# Patient Record
Sex: MALE | NOT HISPANIC OR LATINO | Employment: FULL TIME | URBAN - METROPOLITAN AREA
[De-identification: names, ages, dates, MRNs, and addresses within clinical notes are randomized per-mention and may not be internally consistent; named-entity substitution may affect disease eponyms.]

---

## 2017-04-05 ENCOUNTER — PATIENT MESSAGE (OUTPATIENT)
Dept: MEDICAL GROUP | Facility: MEDICAL CENTER | Age: 32
End: 2017-04-05

## 2017-12-04 ENCOUNTER — APPOINTMENT (OUTPATIENT)
Dept: ADMISSIONS | Facility: MEDICAL CENTER | Age: 32
End: 2017-12-04
Attending: ORTHOPAEDIC SURGERY
Payer: COMMERCIAL

## 2017-12-04 RX ORDER — IBUPROFEN 200 MG
200 TABLET ORAL EVERY 6 HOURS PRN
COMMUNITY
End: 2017-12-28

## 2017-12-07 ENCOUNTER — HOSPITAL ENCOUNTER (OUTPATIENT)
Facility: MEDICAL CENTER | Age: 32
End: 2017-12-07
Attending: ORTHOPAEDIC SURGERY | Admitting: ORTHOPAEDIC SURGERY
Payer: COMMERCIAL

## 2017-12-07 VITALS
HEIGHT: 74 IN | BODY MASS INDEX: 32.08 KG/M2 | OXYGEN SATURATION: 95 % | SYSTOLIC BLOOD PRESSURE: 112 MMHG | RESPIRATION RATE: 12 BRPM | DIASTOLIC BLOOD PRESSURE: 58 MMHG | WEIGHT: 250 LBS | TEMPERATURE: 97.2 F

## 2017-12-07 PROCEDURE — 700101 HCHG RX REV CODE 250

## 2017-12-07 PROCEDURE — A9270 NON-COVERED ITEM OR SERVICE: HCPCS

## 2017-12-07 PROCEDURE — 502581 HCHG PACK, SHOULDER ARTHROSCOPY: Performed by: ORTHOPAEDIC SURGERY

## 2017-12-07 PROCEDURE — 700105 HCHG RX REV CODE 258: Performed by: ORTHOPAEDIC SURGERY

## 2017-12-07 PROCEDURE — 501838 HCHG SUTURE GENERAL: Performed by: ORTHOPAEDIC SURGERY

## 2017-12-07 PROCEDURE — 160041 HCHG SURGERY MINUTES - EA ADDL 1 MIN LEVEL 4: Performed by: ORTHOPAEDIC SURGERY

## 2017-12-07 PROCEDURE — 160035 HCHG PACU - 1ST 60 MINS PHASE I: Performed by: ORTHOPAEDIC SURGERY

## 2017-12-07 PROCEDURE — 160025 RECOVERY II MINUTES (STATS): Performed by: ORTHOPAEDIC SURGERY

## 2017-12-07 PROCEDURE — 160036 HCHG PACU - EA ADDL 30 MINS PHASE I: Performed by: ORTHOPAEDIC SURGERY

## 2017-12-07 PROCEDURE — 700111 HCHG RX REV CODE 636 W/ 250 OVERRIDE (IP)

## 2017-12-07 PROCEDURE — 160048 HCHG OR STATISTICAL LEVEL 1-5: Performed by: ORTHOPAEDIC SURGERY

## 2017-12-07 PROCEDURE — 500028 HCHG ARTHROWAND TURBOVAC 3.5/90 SUCT.: Performed by: ORTHOPAEDIC SURGERY

## 2017-12-07 PROCEDURE — 160029 HCHG SURGERY MINUTES - 1ST 30 MINS LEVEL 4: Performed by: ORTHOPAEDIC SURGERY

## 2017-12-07 PROCEDURE — 160009 HCHG ANES TIME/MIN: Performed by: ORTHOPAEDIC SURGERY

## 2017-12-07 PROCEDURE — 160046 HCHG PACU - 1ST 60 MINS PHASE II: Performed by: ORTHOPAEDIC SURGERY

## 2017-12-07 PROCEDURE — 160002 HCHG RECOVERY MINUTES (STAT): Performed by: ORTHOPAEDIC SURGERY

## 2017-12-07 PROCEDURE — 700102 HCHG RX REV CODE 250 W/ 637 OVERRIDE(OP)

## 2017-12-07 PROCEDURE — 500144 HCHG CANNULA KIT, UNIV GREY-SHOULDER: Performed by: ORTHOPAEDIC SURGERY

## 2017-12-07 RX ORDER — BUPIVACAINE HYDROCHLORIDE AND EPINEPHRINE 5; 5 MG/ML; UG/ML
INJECTION, SOLUTION EPIDURAL; INTRACAUDAL; PERINEURAL
Status: DISCONTINUED | OUTPATIENT
Start: 2017-12-07 | End: 2017-12-07 | Stop reason: HOSPADM

## 2017-12-07 RX ORDER — SODIUM CHLORIDE, SODIUM LACTATE, POTASSIUM CHLORIDE, CALCIUM CHLORIDE 600; 310; 30; 20 MG/100ML; MG/100ML; MG/100ML; MG/100ML
INJECTION, SOLUTION INTRAVENOUS
Status: DISCONTINUED | OUTPATIENT
Start: 2017-12-07 | End: 2017-12-07 | Stop reason: HOSPADM

## 2017-12-07 RX ORDER — HYDROCODONE BITARTRATE AND ACETAMINOPHEN 5; 325 MG/1; MG/1
1-2 TABLET ORAL EVERY 4 HOURS PRN
Qty: 45 TAB | Refills: 0 | Status: SHIPPED | OUTPATIENT
Start: 2017-12-07 | End: 2017-12-28

## 2017-12-07 RX ORDER — HALOPERIDOL 5 MG/ML
INJECTION INTRAMUSCULAR
Status: COMPLETED
Start: 2017-12-07 | End: 2017-12-07

## 2017-12-07 RX ORDER — GINSENG 100 MG
CAPSULE ORAL
Status: DISCONTINUED | OUTPATIENT
Start: 2017-12-07 | End: 2017-12-07 | Stop reason: HOSPADM

## 2017-12-07 RX ORDER — OXYCODONE HCL 5 MG/5 ML
SOLUTION, ORAL ORAL
Status: COMPLETED
Start: 2017-12-07 | End: 2017-12-07

## 2017-12-07 RX ADMIN — OXYCODONE HYDROCHLORIDE 10 MG: 5 SOLUTION ORAL at 10:49

## 2017-12-07 RX ADMIN — SODIUM CHLORIDE, POTASSIUM CHLORIDE, SODIUM LACTATE AND CALCIUM CHLORIDE: 600; 310; 30; 20 INJECTION, SOLUTION INTRAVENOUS at 07:47

## 2017-12-07 RX ADMIN — FENTANYL CITRATE 50 MCG: 50 INJECTION, SOLUTION INTRAMUSCULAR; INTRAVENOUS at 10:48

## 2017-12-07 RX ADMIN — HALOPERIDOL LACTATE 1 MG: 5 INJECTION, SOLUTION INTRAMUSCULAR at 10:48

## 2017-12-07 ASSESSMENT — PAIN SCALES - GENERAL
PAINLEVEL_OUTOF10: 0
PAINLEVEL_OUTOF10: ASSUMED PAIN PRESENT
PAINLEVEL_OUTOF10: 0
PAINLEVEL_OUTOF10: 0

## 2017-12-07 NOTE — OR NURSING
1130  Pt to stage two via jenna. Pt denies pain and nausea at this time. Pt getting dressed with help of CNA.   1135 Pt up to chair with help of CNA. VSS.   1200Explained discharge instructions to pt and pts wife. Both express understanding   1205 Pt meets criteria to be discharged after uneventful stay in stage two

## 2017-12-07 NOTE — OR NURSING
" 0950 To PACU from OR on [facility]. All bedside rails up for safe transfer. Sleeping with opa. Even non labored breathing. Lung sounds bilaterally. Skin pink warm dry. Rue dressing cdi, sling, ice pack, 2+ radial pulse, brisk cap refill, pink warm.    0959 no changes  1013 no changes  1017 opa removed. Spontaneous even non labored breathing. arousable and calling. Denies pain and nausea. Resting with eyes closed, face relaxed.   1049 awake and alert. C/o pain and nausea. Medicated.   1104 resting with eyes closed, face relaxed, even non labored breathing.   1128 awake, denies pain and nausea. States \"ready for discharge\"   1130 pt meets criteria for stage 2. Report given to Daisy kate    "

## 2017-12-07 NOTE — OP REPORT
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    POSTOPERATIVE DIAGNOSIS: right shoulder type 2 acromion and acromioclavicular arthritis.     PROCEDURE PERFORMED: right shoulder arthroscopic subacromioal decompression and distal clavicle excision    SURGEON: Stephen Gipson MD    ANESTHESIA: General     ANTIBIOTICS: Ancef    COMPLICATIONS: None.     INDICATIONS: The patient presents with right shoulder pain recalcitrant to conservative treatment. The patient has evidence of a right shoulder rotator cuff impingement with type 2 acromion and AC arthritis on imaging. Options were discussed, including both non operative and operative treatments. Risks of surgery were discussed at length. All questions were answered. No guarantees were given.     PROCEDURE IN DETAIL: The patient was properly identified in the preoperative holding area, and the right shoulder was marked as the correct surgical site. The patient was then taken back to the operative room, and placed supine on the operating room table and underwent general anesthesia. The patient was placed in a beach chair position. The right upper extremity was sterilely prepped and draped in standard fashion.     A standard posterior portal was created. The scope was placed up the glenohumeral joint. An anterior portal was created through the rotator cuff interval just below the biceps. The was no SLAP tear.   There was no evidence of biceps tendinosis. There was no rotator cuff tear. . The rest of the joint was inspected. There was no chondromalacia both in the humeral head and the glenoid. No loose bodies were seen.    The scope was then placed in the subacromial space. A lateral portal was created. A bursectomy was performed, exposing the anterior and lateral acromion. A 5/5 resector was used to perform a subacromial decompression removing several millimeters of bone off the anterior and lateral  acromion and the through the anterior portal a distal clavicle excision was preformed with a 5/5 resector.  The rotator cuff looked good from the bursal side as well.  Excess fluid was drained. Incision were closed with 2-0 vicryl and 3-0 nylon. A total of 20ml 1% marcaine with epinephrine was injected. TATIANA Zhu, was present throughout the operation as an essential part of the success of there operation assisting with patient position, instrumentation, retraction and closure.     The patient was extubated and transferred to the recovery room in stable condition.

## 2017-12-07 NOTE — DISCHARGE INSTRUCTIONS
ACTIVITY: Rest and take it easy for the first 24 hours.  A responsible adult is recommended to remain with you during that time.  It is normal to feel sleepy.  We encourage you to not do anything that requires balance, judgment or coordination.    MILD FLU-LIKE SYMPTOMS ARE NORMAL. YOU MAY EXPERIENCE GENERALIZED MUSCLE ACHES, THROAT IRRITATION, HEADACHE AND/OR SOME NAUSEA.    FOR 24 HOURS DO NOT:  Drive, operate machinery or run household appliances.  Drink beer or alcoholic beverages.   Make important decisions or sign legal documents.    SPECIAL INSTRUCTIONS:    Sling as needed    Keep dressings on and dry for five days then may apply band-aids.    Physical therapy  Call for questions or concerns.    DIET: To avoid nausea, slowly advance diet as tolerated, avoiding spicy or greasy foods for the first day.  Add more substantial food to your diet according to your physician's instructions.  Babies can be fed formula or breast milk as soon as they are hungry.  INCREASE FLUIDS AND FIBER TO AVOID CONSTIPATION.      FOLLOW-UP APPOINTMENT:  A follow-up appointment should be arranged with your doctor: call to schedule.    You should CALL YOUR PHYSICIAN if you develop:  Fever greater than 101 degrees F.  Pain not relieved by medication, or persistent nausea or vomiting.  Excessive bleeding (blood soaking through dressing) or unexpected drainage from the wound.  Extreme redness or swelling around the incision site, drainage of pus or foul smelling drainage.  Inability to urinate or empty your bladder within 8 hours.  Problems with breathing or chest pain.    You should call 911 if you develop problems with breathing or chest pain.  If you are unable to contact your doctor or surgical center, you should go to the nearest emergency room or urgent care center.  Physician's telephone #: [phone]    If any questions arise, call your doctor.  If your doctor is not available, please feel free to call the Surgical Center at  [phone].  The Center is open Monday through Friday from 7AM to 7PM.  You can also call the HEALTH HOTLINE open 24 hours/day, 7 days/week and speak to a nurse at [phone], or toll free at [phone].    A registered nurse may call you a few days after your surgery to see how you are doing after your procedure.    MEDICATIONS: Resume taking daily medication.  Take prescribed pain medication with food.  If no medication is prescribed, you may take non-aspirin pain medication if needed.  PAIN MEDICATION CAN BE VERY CONSTIPATING.  Take a stool softener or laxative such as senokot, pericolace, or milk of magnesia if needed.    Prescription given for norco.  Last pain medication given at 1049 10 mg oxycodone.    If your physician has prescribed pain medication that includes Acetaminophen (Tylenol), do not take additional Acetaminophen (Tylenol) while taking the prescribed medication.    Depression / Suicide Risk    As you are discharged from this [facility] facility, it is important to learn how to keep safe from harming yourself.    Recognize the warning signs:  · Abrupt changes in personality, positive or negative- including increase in energy   · Giving away possessions  · Change in eating patterns- significant weight changes-  positive or negative  · Change in sleeping patterns- unable to sleep or sleeping all the time   · Unwillingness or inability to communicate  · Depression  · Unusual sadness, discouragement and loneliness  · Talk of wanting to die  · Neglect of personal appearance   · Rebelliousness- reckless behavior  · Withdrawal from people/activities they love  · Confusion- inability to concentrate     If you or a loved one observes any of these behaviors or has concerns about self-harm, here's what you can do:  · Talk about it- your feelings and reasons for harming yourself  · Remove any means that you might use to hurt yourself (examples: pills, rope, extension cords, firearm)  · Get  professional help from the community (Mental Health, Substance Abuse, psychological counseling)  · Do not be alone:Call your Safe Contact- someone whom you trust who will be there for you.  · Call your local CRISIS HOTLINE 690-1562 or 912-333-4509  · Call your local Children's Mobile Crisis Response Team [place] (868) 717-8685 or www.[employer]  · Call the toll free National Suicide Prevention Hotlines   · National Suicide Prevention Lifeline 496-754-IVWV (6603)  · National Hope Line Network 800-SUICIDE (329-9847)

## 2017-12-28 ENCOUNTER — OFFICE VISIT (OUTPATIENT)
Dept: MEDICAL GROUP | Facility: PHYSICIAN GROUP | Age: 32
End: 2017-12-28
Payer: COMMERCIAL

## 2017-12-28 VITALS
HEIGHT: 74 IN | OXYGEN SATURATION: 97 % | RESPIRATION RATE: 16 BRPM | WEIGHT: 250 LBS | BODY MASS INDEX: 32.08 KG/M2 | TEMPERATURE: 98.2 F | SYSTOLIC BLOOD PRESSURE: 130 MMHG | HEART RATE: 84 BPM | DIASTOLIC BLOOD PRESSURE: 78 MMHG

## 2017-12-28 DIAGNOSIS — E66.9 OBESITY (BMI 30-39.9): ICD-10-CM

## 2017-12-28 DIAGNOSIS — G89.29 CHRONIC RIGHT SHOULDER PAIN: ICD-10-CM

## 2017-12-28 DIAGNOSIS — M25.511 CHRONIC RIGHT SHOULDER PAIN: ICD-10-CM

## 2017-12-28 DIAGNOSIS — S81.802A WOUND OF LEFT LOWER EXTREMITY, INITIAL ENCOUNTER: ICD-10-CM

## 2017-12-28 PROCEDURE — 99214 OFFICE O/P EST MOD 30 MIN: CPT | Performed by: FAMILY MEDICINE

## 2017-12-28 RX ORDER — TRIAMCINOLONE ACETONIDE 1 MG/G
CREAM TOPICAL
Qty: 1 TUBE | Refills: 2 | Status: SHIPPED | OUTPATIENT
Start: 2017-12-28 | End: 2018-01-12

## 2017-12-28 RX ORDER — CEPHALEXIN 500 MG/1
500 CAPSULE ORAL 4 TIMES DAILY
Qty: 20 CAP | Refills: 0 | Status: SHIPPED | OUTPATIENT
Start: 2017-12-28 | End: 2018-01-02

## 2017-12-28 RX ORDER — IBUPROFEN 600 MG/1
600 TABLET ORAL EVERY 6 HOURS PRN
Qty: 90 TAB | Refills: 2 | Status: SHIPPED | OUTPATIENT
Start: 2017-12-28 | End: 2018-05-12 | Stop reason: SDUPTHER

## 2017-12-28 RX ORDER — TRAMADOL HYDROCHLORIDE 50 MG/1
50 TABLET ORAL EVERY 8 HOURS PRN
Qty: 21 TAB | Refills: 0 | Status: SHIPPED | OUTPATIENT
Start: 2017-12-28 | End: 2018-01-04

## 2017-12-28 ASSESSMENT — PATIENT HEALTH QUESTIONNAIRE - PHQ9: CLINICAL INTERPRETATION OF PHQ2 SCORE: 0

## 2017-12-28 NOTE — ASSESSMENT & PLAN NOTE
Left leg wound sustained about 12/7/17, about the same time he had right shoulder arthroscopic surgery.   He cleaned the wound, applied a bandaid and it looked like it was healing as normal, but about 3 days ago he noticed it looked red and inflammed and developed some bumpy lesions.   Will try short course of antibiotic and topical steroid cream if now improvement will need to get biopsy.

## 2017-12-28 NOTE — ASSESSMENT & PLAN NOTE
Had recent arthroscopic surgery done by Dr. Kathleen and he has a follow up appointment on Jan 9th. He is going to schedule PT.   Using some ibuprofen needs a refill

## 2017-12-28 NOTE — LETTER
2017        Perfecto Velasco ERICA 1985 was evaluated by me in clinic today. Please excuse him from work for 17.     Contact my office with any questions.     Thank you,         Arcelia Padilla M.D.

## 2017-12-28 NOTE — PROGRESS NOTES
"Subjective:   Perfecto Velasco is a 32 y.o. male here today for evaluation and management of:     Leg wound, left  Left leg wound sustained about 12/7/17, about the same time he had right shoulder arthroscopic surgery.   He cleaned the wound, applied a bandaid and it looked like it was healing as normal, but about 3 days ago he noticed it looked red and inflammed and developed some bumpy lesions.   Will try short course of antibiotic and topical steroid cream if now improvement will need to get biopsy.       Chronic right shoulder pain  Had recent arthroscopic surgery done by Dr. Kathleen and he has a follow up appointment on Jan 9th. He is going to schedule PT.   Using some ibuprofen needs a refill         Current medicines (including changes today)  Current Outpatient Prescriptions   Medication Sig Dispense Refill   • triamcinolone acetonide (KENALOG) 0.1 % Cream Use a pea sized amount and apply in a thin film over affected skin twice a day for 4 weeks. Stop for a week if continuing. 1 Tube 2   • ibuprofen (MOTRIN) 600 MG Tab Take 1 Tab by mouth every 6 hours as needed. 90 Tab 2   • omeprazole (PRILOSEC) 20 MG delayed-release capsule Take 1 Cap by mouth every day. 30 Cap 6     No current facility-administered medications for this visit.      He  has a past medical history of Anesthesia; GERD (gastroesophageal reflux disease); and Shoulder pain. He also has no past medical history of Hyperlipidemia.    ROS  No chest pain, no shortness of breath, no abdominal pain       Objective:     Blood pressure 130/78, pulse 84, temperature 36.8 °C (98.2 °F), resp. rate 16, height 1.88 m (6' 2\"), weight 113.4 kg (250 lb), SpO2 97 %. Body mass index is 32.1 kg/m².   Physical Exam:  Constitutional: Alert, no distress.  Skin: Warm, dry, good turgor, no rashes in visible areas. Left leg mid shin irregular raised nodular erythematous, dry shiny scabby lesion over site of cut.   No bleeding, no pus,   Eye: Equal, round and reactive, " conjunctiva clear, lids normal.  ENMT: Lips without lesions, good dentition, oropharynx clear.  Neck: Trachea midline, no masses, no thyromegaly. No cervical or supraclavicular lymphadenopathy  Respiratory: Unlabored respiratory effort, lungs clear to auscultation, no wheezes, no ronchi.  Cardiovascular: Normal S1, S2, no murmur, no edema.  Abdomen: Soft, non-tender, no masses, no hepatosplenomegaly.  Psych: Alert and oriented x3, normal affect and mood.        Assessment and Plan:   The following treatment plan was discussed    1. Wound of left lower extremity, initial encounter  Biopsy if no improvement after topical antibiotic and topical steroid.       2. Obesity (BMI 30-39.9)  - Patient identified as having weight management issue.  Appropriate orders and counseling given.    3. Chronic right shoulder pain  - ibuprofen (MOTRIN) 600 MG Tab; Take 1 Tab by mouth every 6 hours as needed.  Dispense: 90 Tab; Refill: 2  - tramadol (ULTRAM) 50 MG Tab; Take 1 Tab by mouth every 8 hours as needed for up to 7 days.  Dispense: 21 Tab; Refill: 0  - CONSENT FOR OPIATE PRESCRIPTION      Followup: Return in about 2 months (around 2/28/2018) for skin biopsy.

## 2018-01-12 ENCOUNTER — OFFICE VISIT (OUTPATIENT)
Dept: MEDICAL GROUP | Facility: PHYSICIAN GROUP | Age: 33
End: 2018-01-12
Payer: COMMERCIAL

## 2018-01-12 VITALS
HEART RATE: 98 BPM | TEMPERATURE: 99.2 F | BODY MASS INDEX: 32.08 KG/M2 | DIASTOLIC BLOOD PRESSURE: 80 MMHG | HEIGHT: 74 IN | RESPIRATION RATE: 20 BRPM | WEIGHT: 250 LBS | OXYGEN SATURATION: 98 % | SYSTOLIC BLOOD PRESSURE: 132 MMHG

## 2018-01-12 DIAGNOSIS — J02.9 SORE THROAT: ICD-10-CM

## 2018-01-12 DIAGNOSIS — J06.9 VIRAL UPPER RESPIRATORY TRACT INFECTION: ICD-10-CM

## 2018-01-12 DIAGNOSIS — R05.9 COUGH WITH FEVER: ICD-10-CM

## 2018-01-12 DIAGNOSIS — R50.9 COUGH WITH FEVER: ICD-10-CM

## 2018-01-12 LAB
FLUAV+FLUBV AG SPEC QL IA: NEGATIVE
INT CON NEG: NEGATIVE
INT CON NEG: NEGATIVE
INT CON POS: POSITIVE
INT CON POS: POSITIVE
S PYO AG THROAT QL: NEGATIVE

## 2018-01-12 PROCEDURE — 99213 OFFICE O/P EST LOW 20 MIN: CPT | Performed by: NURSE PRACTITIONER

## 2018-01-12 PROCEDURE — 87880 STREP A ASSAY W/OPTIC: CPT | Performed by: NURSE PRACTITIONER

## 2018-01-12 PROCEDURE — 87804 INFLUENZA ASSAY W/OPTIC: CPT | Performed by: NURSE PRACTITIONER

## 2018-01-12 RX ORDER — CODEINE PHOSPHATE AND GUAIFENESIN 10; 100 MG/5ML; MG/5ML
5 SOLUTION ORAL EVERY 6 HOURS PRN
Qty: 280 ML | Refills: 0 | Status: SHIPPED | OUTPATIENT
Start: 2018-01-12 | End: 2018-01-26

## 2018-01-12 NOTE — PATIENT INSTRUCTIONS
Elevate head of bed  Humidifier  Honey and lemon juice  Delsyn cough syrup during day  Codeine cough syrup at night

## 2018-01-12 NOTE — PROGRESS NOTES
CC: Sore throat and cough    HISTORY OF THE PRESENT ILLNESS: Patient is a 32 y.o. male. This pleasant patient is here today for sore throat and cough.      URI (upper respiratory infection)  This is new onset per patient, which started 2 days ago with a cough and sore throat. Last night he developed body aches and chills. He reports that the cough is keeping him up at night. He reports a white patch on his left tonsil. He denies nasal congestion, otalgia, rash, diarrhea, and abdominal pain. He does report some mild nausea last night. He is able to eat and drink adequately. He has tried Mucinex and cough drops. He reports that his wife and kids were sick 2 weeks ago. No one at home is sick currently. Patient has a low-grade temp today of 99.2. We will swab for strep and influenza.      Allergies: Amoxicillin    Current Outpatient Prescriptions Ordered in [facility]   Medication Sig Dispense Refill   • guaifenesin-codeine (ROBITUSSIN AC) Solution oral solution Take 5 mL by mouth every 6 hours as needed for Cough for up to 14 days. 280 mL 0   • ibuprofen (MOTRIN) 600 MG Tab Take 1 Tab by mouth every 6 hours as needed. 90 Tab 2     No current Epic-ordered facility-administered medications on file.        Past Medical History:   Diagnosis Date   • Anesthesia     wakes up combative   • GERD (gastroesophageal reflux disease)    • Shoulder pain        Past Surgical History:   Procedure Laterality Date   • SHOULDER DECOMPRESSION ARTHROSCOPIC Right 12/7/2017    Procedure: SHOULDER DECOMPRESSION ARTHROSCOPIC - SUBACROMIAL;  Surgeon: Stephen Gipson M.D.;  Location: SURGERY [facility];  Service: Orthopedics   • CLAVICLE DISTAL EXCISION Right 12/7/2017    Procedure: CLAVICLE DISTAL EXCISION;  Surgeon: Stephen Gipson M.D.;  Location: SURGERY [facility];  Service: Orthopedics   • OTHER  2003    nasal, wrist surgeries post trauma   • OTHER      wisdom teeth extraction   • SEPTAL RECONSTRUCTION         Social History  "  Substance Use Topics   • Smoking status: Never Smoker   • Smokeless tobacco: Never Used   • Alcohol use 0.0 oz/week      Comment: 1-2 drinks per week       Family History   Problem Relation Age of Onset   • Hypertension Father    • Cancer Maternal Aunt      brain   • No Known Problems Sister    • No Known Problems Brother        ROS:    As in HPI         Exam: Blood pressure 132/80, pulse 98, temperature 37.3 °C (99.2 °F), resp. rate 20, height 1.88 m (6' 2\"), weight 113.4 kg (250 lb), SpO2 98 %. Body mass index is 32.1 kg/m².    General: Alert, ill-appearing, well nourished, well developed male in NAD  HEENT: Normocephalic. Eyes conjunctiva clear lids without ptosis, pupils equal and reactive to light, ears normal shape and contour, canals are clear bilaterally, tympanic membranes are pearly gray with good light reflex, nasal mucosa without erythema and drainage. Oropharynx with mild erythema, no tonsillar enlargement, one white spot on left tonsil.    Neck: Supple. No lymphadenopathy.  Pulmonary: Clear to ausculation.  Normal effort. No rales, ronchi, or wheezing.  Cardiovascular: Regular rate and rhythm without murmur.   Abdomen: Soft, nondistended. Mild epigastric tenderness with palpation. Normal bowel sounds. Liver and spleen are not palpable  Skin: Warm and dry.  No obvious rash.    Please note that this dictation was created using voice recognition software. I have made every reasonable attempt to correct obvious errors, but I expect that there are errors of grammar and possibly content that I did not discover before finalizing the note.      Assessment/Plan  1. Sore throat  Negative result  - POCT Rapid Strep A    2. Cough with fever  Negative result.  - POCT Influenza A/B    3. Viral upper respiratory tract infection  This is likely a viral infection, and influenza has been ruled out. I have instructed patient on comfort measures such as humidifier, honey and lemon juice for cough, and elevating head of " bed. I have prescribed a cough syrup for nighttime. Patient will follow-up in clinic in one week if symptoms have not improved or sooner if symptoms get worse.  - guaifenesin-codeine (ROBITUSSIN AC) Solution oral solution; Take 5 mL by mouth every 6 hours as needed for Cough for up to 14 days.  Dispense: 280 mL; Refill: 0

## 2018-01-12 NOTE — ASSESSMENT & PLAN NOTE
This is new onset per patient, which started 2 days ago with a cough and sore throat. Last night he developed body aches and chills. He reports that the cough is keeping him up at night. He reports a white patch on his left tonsil. He denies nasal congestion, otalgia, rash, diarrhea, and abdominal pain. He does report some mild nausea last night. He is able to eat and drink adequately. He has tried Mucinex and cough drops. He reports that his wife and kids were sick 2 weeks ago. No one at home is sick currently. Patient has a low-grade temp today of 99.2. We will swab for strep and influenza.

## 2018-01-12 NOTE — LETTER
[address] [address]                                       January 12, 2018    Patient: Perfecto Velasco   YOB: 1985   Date of Visit: 1/12/2018       To Whom It May Concern:    Perfecto Velasco was seen and treated in our department on 1/12/2018.     Sincerely,     CHARLES Pickett.

## 2018-01-12 NOTE — LETTER
[address] [address]     January 12, 2018    Patient: Perfecto Velasco   YOB: 1985   Date of Visit: 1/12/2018       To Whom It May Concern:    Perfecto Velasco was seen and treated in our department on 1/12/2018.     Sincerely,     CHARLES Pickett.

## 2018-04-11 ENCOUNTER — PATIENT MESSAGE (OUTPATIENT)
Dept: MEDICAL GROUP | Facility: PHYSICIAN GROUP | Age: 33
End: 2018-04-11

## 2018-04-11 DIAGNOSIS — K21.9 GASTROESOPHAGEAL REFLUX DISEASE WITHOUT ESOPHAGITIS: ICD-10-CM

## 2018-04-13 RX ORDER — OMEPRAZOLE 20 MG/1
20 CAPSULE, DELAYED RELEASE ORAL DAILY
Qty: 30 CAP | Refills: 6 | Status: SHIPPED | OUTPATIENT
Start: 2018-04-13 | End: 2021-10-26

## 2018-04-28 ENCOUNTER — OFFICE VISIT (OUTPATIENT)
Dept: URGENT CARE | Facility: PHYSICIAN GROUP | Age: 33
End: 2018-04-28
Payer: COMMERCIAL

## 2018-04-28 VITALS
HEART RATE: 112 BPM | OXYGEN SATURATION: 97 % | TEMPERATURE: 98.6 F | HEIGHT: 74 IN | RESPIRATION RATE: 18 BRPM | SYSTOLIC BLOOD PRESSURE: 112 MMHG | WEIGHT: 252 LBS | DIASTOLIC BLOOD PRESSURE: 70 MMHG | BODY MASS INDEX: 32.34 KG/M2

## 2018-04-28 DIAGNOSIS — J22 LOWER RESPIRATORY INFECTION (E.G., BRONCHITIS, PNEUMONIA, PNEUMONITIS, PULMONITIS): ICD-10-CM

## 2018-04-28 PROCEDURE — 99214 OFFICE O/P EST MOD 30 MIN: CPT | Performed by: PHYSICIAN ASSISTANT

## 2018-04-28 RX ORDER — DOXYCYCLINE HYCLATE 100 MG
100 TABLET ORAL 2 TIMES DAILY
Qty: 10 TAB | Refills: 0 | Status: SHIPPED | OUTPATIENT
Start: 2018-04-28 | End: 2018-05-03

## 2018-04-28 RX ORDER — CODEINE PHOSPHATE AND GUAIFENESIN 10; 100 MG/5ML; MG/5ML
5 SOLUTION ORAL EVERY 6 HOURS PRN
Qty: 140 ML | Refills: 0 | Status: SHIPPED | OUTPATIENT
Start: 2018-04-28 | End: 2018-05-05

## 2018-04-28 NOTE — LETTER
April 28, 2018         Patient: Perfecto Velasco   YOB: 1985   Date of Visit: 4/28/2018           To Whom it May Concern:    Perfecto Velasco was seen in my clinic on 4/28/2018.Please excuse this patient from work due to recent illness today.     If you have any questions or concerns, please don't hesitate to call.        Sincerely,           Roshan Buchanan P.A.-C.  Electronically Signed

## 2018-04-29 ASSESSMENT — ENCOUNTER SYMPTOMS
SPUTUM PRODUCTION: 1
TINGLING: 0
HEADACHES: 0
EYE DISCHARGE: 0
ABDOMINAL PAIN: 0
RHINORRHEA: 1
COUGH: 1
DIARRHEA: 0
SHORTNESS OF BREATH: 0
CHILLS: 0
EYE REDNESS: 0
SORE THROAT: 1
VOMITING: 0
DIZZINESS: 0
WHEEZING: 0
MYALGIAS: 0
FEVER: 0
NECK PAIN: 0

## 2018-04-29 NOTE — PROGRESS NOTES
"Subjective:      Perfecto Velasco is a 32 y.o. male who presents with Cough (SOB, persistent cough)            Cough   This is a new problem. The current episode started in the past 7 days. The problem has been gradually worsening. The problem occurs every few minutes. The cough is non-productive. Associated symptoms include nasal congestion, postnasal drip, rhinorrhea and a sore throat. Pertinent negatives include no chest pain, chills, ear pain, eye redness, fever, headaches, myalgias, rash, shortness of breath or wheezing. Nothing aggravates the symptoms. He has tried OTC cough suppressant for the symptoms. The treatment provided mild relief.   Pt does report that cough is keeping him up at night time.     Review of Systems   Constitutional: Positive for malaise/fatigue. Negative for chills and fever.   HENT: Positive for congestion, postnasal drip, rhinorrhea and sore throat. Negative for ear discharge and ear pain.    Eyes: Negative for discharge and redness.   Respiratory: Positive for cough and sputum production. Negative for shortness of breath and wheezing.    Cardiovascular: Negative for chest pain and leg swelling.   Gastrointestinal: Negative for abdominal pain, diarrhea and vomiting.   Genitourinary: Negative for dysuria and urgency.   Musculoskeletal: Negative for myalgias and neck pain.   Skin: Negative for itching and rash.   Neurological: Negative for dizziness, tingling and headaches.          Objective:     /70   Pulse (!) 112   Temp 37 °C (98.6 °F)   Resp 18   Ht 1.88 m (6' 2\")   Wt 114.3 kg (252 lb)   SpO2 97%   BMI 32.35 kg/m²    PMH:  has a past medical history of Anesthesia; GERD (gastroesophageal reflux disease); and Shoulder pain. He also has no past medical history of Hyperlipidemia.  MEDS:   Current Outpatient Prescriptions:   •  guaifenesin-codeine (ROBITUSSIN AC) Solution oral solution, Take 5 mL by mouth every 6 hours as needed for Cough (May cause sedation) for up to 7 " days., Disp: 140 mL, Rfl: 0  •  doxycycline (VIBRAMYCIN) 100 MG Tab, Take 1 Tab by mouth 2 times a day for 5 days., Disp: 10 Tab, Rfl: 0  •  ALBUTEROL SULFATE HFA INH, by Nebulization route., Disp: , Rfl:   •  omeprazole (PRILOSEC) 20 MG delayed-release capsule, Take 1 Cap by mouth every day., Disp: 30 Cap, Rfl: 6  •  ibuprofen (MOTRIN) 600 MG Tab, Take 1 Tab by mouth every 6 hours as needed., Disp: 90 Tab, Rfl: 2  ALLERGIES:   Allergies   Allergen Reactions   • Amoxicillin      vomiting     SURGHX:   Past Surgical History:   Procedure Laterality Date   • SHOULDER DECOMPRESSION ARTHROSCOPIC Right 12/7/2017    Procedure: SHOULDER DECOMPRESSION ARTHROSCOPIC - SUBACROMIAL;  Surgeon: Stephen Gipson M.D.;  Location: SURGERY [facility];  Service: Orthopedics   • CLAVICLE DISTAL EXCISION Right 12/7/2017    Procedure: CLAVICLE DISTAL EXCISION;  Surgeon: Stephen Gipson M.D.;  Location: SURGERY [facility];  Service: Orthopedics   • OTHER  2003    nasal, wrist surgeries post trauma   • OTHER      wisdom teeth extraction   • SEPTAL RECONSTRUCTION       SOCHX:  reports that he has never smoked. He has never used smokeless tobacco. He reports that he drinks alcohol. He reports that he does not use drugs.  FH: Family history was reviewed, no pertinent findings to report    Physical Exam   Constitutional: He is oriented to person, place, and time. He appears well-developed and well-nourished.   HENT:   Head: Normocephalic and atraumatic.   Mouth/Throat: No oropharyngeal exudate.   Ears- Canals clear- TM- with clear fluid effusions bilaterally.   Pos. PND, with slight erythema- without tonsillar edema or exudate.   Mild discharge noted bilaterally- to nares.      Eyes: EOM are normal. Pupils are equal, round, and reactive to light.   Neck: Normal range of motion. Neck supple.   Cardiovascular: Normal rate and regular rhythm.    No murmur heard.  Pulmonary/Chest: Effort normal and breath sounds normal. No respiratory  distress.   Harsh bronchial cough throughout the duration of the visit.    Musculoskeletal: Normal range of motion. He exhibits no tenderness.   Lymphadenopathy:     He has no cervical adenopathy.   Neurological: He is alert and oriented to person, place, and time.   Skin: Skin is warm. No rash noted.   Psychiatric: He has a normal mood and affect. His behavior is normal.   Vitals reviewed.              Assessment/Plan:     1. Lower respiratory infection (e.g., bronchitis, pneumonia, pneumonitis, pulmonitis)  - guaifenesin-codeine (ROBITUSSIN AC) Solution oral solution; Take 5 mL by mouth every 6 hours as needed for Cough (May cause sedation) for up to 7 days.  Dispense: 140 mL; Refill: 0  - doxycycline (VIBRAMYCIN) 100 MG Tab; Take 1 Tab by mouth 2 times a day for 5 days.  Dispense: 10 Tab; Refill: 0    NARXCHECK was reviewed by myself-  Document does not reveal any concerning patterns. Pt. was advised to avoid the operation of heavy machine along with driving while on such medications. Finally pt. was advised to use medication only as prescribed. Work note written.   Encouraged OTC supportive meds PRN. Humidification, increase fluids, avoid night time dairy.   Patient given precautionary s/sx that mandate immediate follow up and evaluation in the ED. Advised of risks of not doing so.    DDX, Supportive care, and indications for immediate follow-up discussed with patient.    Instructed to return to clinic or nearest emergency department if we are not available for any change in condition, further concerns, or worsening of symptoms.    The patient demonstrated a good understanding and agreed with the treatment plan.

## 2018-05-12 DIAGNOSIS — G89.29 CHRONIC RIGHT SHOULDER PAIN: ICD-10-CM

## 2018-05-12 DIAGNOSIS — M25.511 CHRONIC RIGHT SHOULDER PAIN: ICD-10-CM

## 2018-05-14 RX ORDER — IBUPROFEN 600 MG/1
TABLET ORAL
Qty: 90 TAB | Refills: 0 | Status: SHIPPED | OUTPATIENT
Start: 2018-05-14 | End: 2018-10-19 | Stop reason: SDUPTHER

## 2018-10-19 DIAGNOSIS — G89.29 CHRONIC RIGHT SHOULDER PAIN: ICD-10-CM

## 2018-10-19 DIAGNOSIS — M25.511 CHRONIC RIGHT SHOULDER PAIN: ICD-10-CM

## 2018-10-22 RX ORDER — IBUPROFEN 600 MG/1
TABLET ORAL
Qty: 90 TAB | Refills: 0 | Status: SHIPPED | OUTPATIENT
Start: 2018-10-22 | End: 2019-02-16 | Stop reason: SDUPTHER

## 2019-02-15 ENCOUNTER — HOSPITAL ENCOUNTER (OUTPATIENT)
Dept: RADIOLOGY | Facility: MEDICAL CENTER | Age: 34
End: 2019-02-15
Attending: NURSE PRACTITIONER
Payer: COMMERCIAL

## 2019-02-15 ENCOUNTER — OFFICE VISIT (OUTPATIENT)
Dept: URGENT CARE | Facility: PHYSICIAN GROUP | Age: 34
End: 2019-02-15
Payer: COMMERCIAL

## 2019-02-15 VITALS
TEMPERATURE: 98.5 F | OXYGEN SATURATION: 99 % | BODY MASS INDEX: 32.08 KG/M2 | WEIGHT: 250 LBS | HEART RATE: 72 BPM | RESPIRATION RATE: 14 BRPM | HEIGHT: 74 IN | SYSTOLIC BLOOD PRESSURE: 110 MMHG | DIASTOLIC BLOOD PRESSURE: 82 MMHG

## 2019-02-15 DIAGNOSIS — M25.562 PAIN AND SWELLING OF LEFT KNEE: ICD-10-CM

## 2019-02-15 DIAGNOSIS — M25.462 PAIN AND SWELLING OF LEFT KNEE: ICD-10-CM

## 2019-02-15 DIAGNOSIS — M25.562 ACUTE PAIN OF LEFT KNEE: ICD-10-CM

## 2019-02-15 PROCEDURE — 99213 OFFICE O/P EST LOW 20 MIN: CPT | Performed by: NURSE PRACTITIONER

## 2019-02-15 PROCEDURE — 73562 X-RAY EXAM OF KNEE 3: CPT | Mod: LT

## 2019-02-15 RX ORDER — KETOROLAC TROMETHAMINE 30 MG/ML
60 INJECTION, SOLUTION INTRAMUSCULAR; INTRAVENOUS ONCE
Status: COMPLETED | OUTPATIENT
Start: 2019-02-15 | End: 2019-02-15

## 2019-02-15 RX ADMIN — KETOROLAC TROMETHAMINE 60 MG: 30 INJECTION, SOLUTION INTRAMUSCULAR; INTRAVENOUS at 11:32

## 2019-02-15 ASSESSMENT — ENCOUNTER SYMPTOMS
MYALGIAS: 0
WEAKNESS: 0
EYE PAIN: 0
SORE THROAT: 0
ABDOMINAL PAIN: 0
VOMITING: 0
DIZZINESS: 0
FEVER: 0
VERTIGO: 0
NAUSEA: 0
CHILLS: 0
ANOREXIA: 0
JOINT SWELLING: 1
SHORTNESS OF BREATH: 0
NUMBNESS: 0
COUGH: 0

## 2019-02-15 NOTE — LETTER
February 15, 2019         Patient: Perfecto Velasco   YOB: 1985   Date of Visit: 2/15/2019           To Whom it May Concern:    Perfecto Velasco was seen in my clinic on 2/15/2019. He may return to work on 2/16/18.     If you have any questions or concerns, please don't hesitate to call.        Sincerely,           CHARLES Rivas.  Electronically Signed

## 2019-02-15 NOTE — PROGRESS NOTES
"Subjective:   Perfecto Velasco is a 33 y.o. male who presents for Knee Pain (x1 day has hurt in the past but its bugging him more)  Patient is a 33-year-old male who presents clinic today for evaluation of left knee pain that started yesterday.  Patient has had problems with his left knee for the past 15 years after he was injured from a fall.  Patient denies any surgical history of the knee.  Pain is exacerbated with walking which she does frequently for his shift at work.  He has been resting, icing taking ibuprofen with no relief in symptoms.  Patient does have an appointment set up with an orthopedic specialist in a week and a half.      Knee Pain   This is a new problem. The current episode started yesterday. The problem occurs constantly. The problem has been unchanged. Associated symptoms include joint swelling (left knee). Pertinent negatives include no abdominal pain, anorexia, chest pain, chills, coughing, fever, myalgias, nausea, numbness, rash, sore throat, vertigo, vomiting or weakness. The symptoms are aggravated by walking. He has tried NSAIDs, ice and rest for the symptoms. The treatment provided no relief.     Review of Systems   Constitutional: Negative for chills and fever.   HENT: Negative for sore throat.    Eyes: Negative for pain.   Respiratory: Negative for cough and shortness of breath.    Cardiovascular: Negative for chest pain.   Gastrointestinal: Negative for abdominal pain, anorexia, nausea and vomiting.   Genitourinary: Negative for hematuria.   Musculoskeletal: Positive for joint pain (left knee) and joint swelling (left knee). Negative for myalgias.   Skin: Negative for rash.   Neurological: Negative for dizziness, vertigo, weakness and numbness.     Allergies   Allergen Reactions   • Amoxicillin      vomiting      Objective:   /82   Pulse 72   Temp 36.9 °C (98.5 °F) (Temporal)   Resp 14   Ht 1.88 m (6' 2\")   Wt 113.4 kg (250 lb)   SpO2 99%   BMI 32.10 kg/m²   Physical " Exam   Constitutional: He is oriented to person, place, and time. He appears well-developed and well-nourished. No distress.   HENT:   Head: Normocephalic and atraumatic.   Right Ear: Tympanic membrane normal.   Left Ear: Tympanic membrane normal.   Nose: Nose normal. Right sinus exhibits no maxillary sinus tenderness and no frontal sinus tenderness. Left sinus exhibits no maxillary sinus tenderness and no frontal sinus tenderness.   Mouth/Throat: Uvula is midline, oropharynx is clear and moist and mucous membranes are normal. No posterior oropharyngeal edema, posterior oropharyngeal erythema or tonsillar abscesses. No tonsillar exudate.   Eyes: Pupils are equal, round, and reactive to light. Conjunctivae and EOM are normal. Right eye exhibits no discharge. Left eye exhibits no discharge.   Cardiovascular: Normal rate and regular rhythm.    No murmur heard.  Pulmonary/Chest: Effort normal and breath sounds normal. No respiratory distress.   Abdominal: Soft. He exhibits no distension. There is no tenderness.   Musculoskeletal:        Left knee: He exhibits decreased range of motion and swelling. He exhibits normal patellar mobility, no bony tenderness, normal meniscus and no MCL laxity. Tenderness found. Medial joint line and lateral joint line tenderness noted.   Gait antalgic     Neurological: He is alert and oriented to person, place, and time. He has normal reflexes. No sensory deficit.   Skin: Skin is warm, dry and intact.   Psychiatric: He has a normal mood and affect.         Assessment/Plan:     1. Acute pain of left knee  DX-KNEE 3 VIEWS LEFT    ketorolac (TORADOL) injection 60 mg    CANCELED: REFERRAL TO ORTHOPEDICS   2. Pain and swelling of left knee  ketorolac (TORADOL) injection 60 mg    CANCELED: REFERRAL TO ORTHOPEDICS       xray results  No evidence of acute fracture or dislocation.    Advised Relative rest, ice, nsaid prn. Elevation and compression prn swelling. Resume activity as tolerated.  Patient  provided a left knee brace and crutches advised to follow-up with orthopedic specialist as scheduled as I suspect meniscal injury.  Letter provided for work   Patient will follow up with orthopedics for further evaluation and management.  Differential diagnosis, natural history, supportive care, and indications for immediate follow-up discussed.

## 2019-02-23 DIAGNOSIS — M25.561 ACUTE PAIN OF RIGHT KNEE: ICD-10-CM

## 2019-07-11 DIAGNOSIS — M25.511 CHRONIC RIGHT SHOULDER PAIN: ICD-10-CM

## 2019-07-11 DIAGNOSIS — K21.9 GASTROESOPHAGEAL REFLUX DISEASE WITHOUT ESOPHAGITIS: ICD-10-CM

## 2019-07-11 DIAGNOSIS — G89.29 CHRONIC RIGHT SHOULDER PAIN: ICD-10-CM

## 2019-07-11 NOTE — TELEPHONE ENCOUNTER
Was the patient seen in the last year in this department? Yes    Does patient have an active prescription for medications requested? No     Received Request Via: Pharmacy      Pt met protocol?: Yes, OV 1/18, last prescribed 2/19 for shoulder pain

## 2019-07-11 NOTE — TELEPHONE ENCOUNTER
MA-Please have pt establish w/new PCP    Was the patient seen in the last year in this department? No    Does patient have an active prescription for medications requested? No     Received Request Via: Pharmacy      Pt met protocol?: No, OV 1/18

## 2019-07-12 RX ORDER — OMEPRAZOLE 20 MG/1
CAPSULE, DELAYED RELEASE ORAL
Refills: 0 | OUTPATIENT
Start: 2019-07-12

## 2019-07-12 RX ORDER — IBUPROFEN 800 MG/1
TABLET ORAL
Refills: 0 | OUTPATIENT
Start: 2019-07-12

## 2020-02-13 ENCOUNTER — OFFICE VISIT (OUTPATIENT)
Dept: URGENT CARE | Facility: PHYSICIAN GROUP | Age: 35
End: 2020-02-13
Payer: COMMERCIAL

## 2020-02-13 VITALS
HEART RATE: 82 BPM | TEMPERATURE: 98 F | RESPIRATION RATE: 16 BRPM | HEIGHT: 74 IN | OXYGEN SATURATION: 97 % | DIASTOLIC BLOOD PRESSURE: 72 MMHG | WEIGHT: 298 LBS | BODY MASS INDEX: 38.24 KG/M2 | SYSTOLIC BLOOD PRESSURE: 122 MMHG

## 2020-02-13 DIAGNOSIS — R05.2 SUBACUTE COUGH: ICD-10-CM

## 2020-02-13 DIAGNOSIS — J22 LRTI (LOWER RESPIRATORY TRACT INFECTION): ICD-10-CM

## 2020-02-13 DIAGNOSIS — R06.2 WHEEZE: ICD-10-CM

## 2020-02-13 PROCEDURE — 99214 OFFICE O/P EST MOD 30 MIN: CPT | Performed by: FAMILY MEDICINE

## 2020-02-13 RX ORDER — ALBUTEROL SULFATE 90 UG/1
2 POWDER, METERED RESPIRATORY (INHALATION) EVERY 4 HOURS PRN
Qty: 1 EACH | Refills: 0 | Status: SHIPPED | OUTPATIENT
Start: 2020-02-13 | End: 2021-10-26

## 2020-02-13 RX ORDER — DOXYCYCLINE HYCLATE 100 MG
100 TABLET ORAL 2 TIMES DAILY
Qty: 14 TAB | Refills: 0 | Status: SHIPPED | OUTPATIENT
Start: 2020-02-13 | End: 2020-02-20

## 2020-02-13 ASSESSMENT — ENCOUNTER SYMPTOMS
EYE REDNESS: 0
NAUSEA: 0
WEIGHT LOSS: 0
EYE DISCHARGE: 0
VOMITING: 0
MYALGIAS: 0

## 2020-02-13 NOTE — PROGRESS NOTES
"Subjective:      Perfecto Velasco is a 34 y.o. male who presents with Cough (chest congestion x4 weeks )            4 weeks chest congestion. Tries to cough phelgm in morning but not productive.  No fever.  +shortness of breath or wheezing. No PMH asthma/pneumonia.  Associated nasal congestion and sore throat.  Symptoms are moderate severity but not improving.  Worse morning and evening.  Minimal relief with OTC medications.  No other aggravating or alleviating factors.      Review of Systems   Constitutional: Negative for malaise/fatigue and weight loss.   Eyes: Negative for discharge and redness.   Gastrointestinal: Negative for nausea and vomiting.   Musculoskeletal: Negative for joint pain and myalgias.   Skin: Negative for itching and rash.     .  Medications, Allergies, and current problem list reviewed today in Epic       Objective:     /72   Pulse 82   Temp 36.7 °C (98 °F)   Resp 16   Ht 1.88 m (6' 2\")   Wt (!) 135.2 kg (298 lb)   SpO2 97%   BMI 38.26 kg/m²      Physical Exam  Constitutional:       General: He is not in acute distress.     Appearance: He is well-developed.   HENT:      Head: Normocephalic and atraumatic.   Eyes:      Conjunctiva/sclera: Conjunctivae normal.   Cardiovascular:      Rate and Rhythm: Normal rate and regular rhythm.      Heart sounds: Normal heart sounds. No murmur.   Pulmonary:      Effort: Pulmonary effort is normal.      Breath sounds: Normal breath sounds. No wheezing.   Skin:     General: Skin is warm and dry.      Findings: No rash.   Neurological:      Mental Status: He is alert and oriented to person, place, and time.                 Assessment/Plan:     1. Subacute cough  Hydrocod Polst-CPM Polst ER (TUSSIONEX) 10-8 MG/5ML Suspension Extended Release   2. LRTI (lower respiratory tract infection)  doxycycline (VIBRAMYCIN) 100 MG Tab   3. Wheeze  Albuterol Sulfate (PROAIR RESPICLICK) 108 (90 Base) MCG/ACT AEROSOL POWDER, BREATH ACTIVATED     Differential " diagnosis, natural history, supportive care, and indications for immediate follow-up discussed at length.     Wheeze is by history    Shared decision to hold cxr today.

## 2020-02-13 NOTE — LETTER
February 13, 2020         Patient: Perfecto Velasco   YOB: 1985   Date of Visit: 2/13/2020           To Whom it May Concern:    Perfecto Velasco was seen in my clinic on 2/13/2020. Please allow albuterol inhaler at work.       Sincerely,           Jf Alberto M.D.  Electronically Signed

## 2021-10-26 ENCOUNTER — OFFICE VISIT (OUTPATIENT)
Dept: URGENT CARE | Facility: PHYSICIAN GROUP | Age: 36
End: 2021-10-26
Payer: COMMERCIAL

## 2021-10-26 ENCOUNTER — APPOINTMENT (OUTPATIENT)
Dept: RADIOLOGY | Facility: IMAGING CENTER | Age: 36
End: 2021-10-26
Attending: PHYSICIAN ASSISTANT
Payer: COMMERCIAL

## 2021-10-26 VITALS
TEMPERATURE: 97.4 F | OXYGEN SATURATION: 98 % | DIASTOLIC BLOOD PRESSURE: 82 MMHG | BODY MASS INDEX: 37.99 KG/M2 | RESPIRATION RATE: 16 BRPM | SYSTOLIC BLOOD PRESSURE: 122 MMHG | HEIGHT: 74 IN | HEART RATE: 65 BPM | WEIGHT: 296 LBS

## 2021-10-26 DIAGNOSIS — S86.911A KNEE STRAIN, RIGHT, INITIAL ENCOUNTER: ICD-10-CM

## 2021-10-26 PROCEDURE — 99214 OFFICE O/P EST MOD 30 MIN: CPT | Mod: 25 | Performed by: PHYSICIAN ASSISTANT

## 2021-10-26 PROCEDURE — 73564 X-RAY EXAM KNEE 4 OR MORE: CPT | Mod: TC,FY,RT | Performed by: PHYSICIAN ASSISTANT

## 2021-10-26 PROCEDURE — 20610 DRAIN/INJ JOINT/BURSA W/O US: CPT | Mod: RT | Performed by: PHYSICIAN ASSISTANT

## 2021-10-26 RX ORDER — COVID-19 MOLECULAR TEST ASSAY
KIT MISCELLANEOUS
COMMUNITY
Start: 2021-10-15

## 2021-10-26 RX ORDER — ALBUTEROL SULFATE 90 UG/1
AEROSOL, METERED RESPIRATORY (INHALATION)
COMMUNITY
End: 2021-10-26

## 2021-10-26 ASSESSMENT — ENCOUNTER SYMPTOMS
NAUSEA: 0
CHILLS: 0
FEVER: 0
SENSORY CHANGE: 0
TINGLING: 0
VOMITING: 0
ABDOMINAL PAIN: 0

## 2021-10-26 NOTE — PROGRESS NOTES
"Subjective:   Perfecto Velasco  is a 36 y.o. male who presents for Knee Pain (R knee, x2days )      Knee Pain  Pertinent negatives include no abdominal pain, chills, fever, nausea, rash or vomiting.   Patient presents urgent care complaining of pain to right knee.  He denies recent trauma or injury but notes a long history with this right knee stating he has had multiple injuries that been relatively mild in the past but has had long-term issues with it.  He states he did see orthopedics and did have an MRI.  He states they were recommending arthroscopic surgery but he was unable to proceed with that.  He did have an injection over 1 years ago that helped significantly with pain to right knee.  Currently he denies catching or locking but complains of diffuse achy pain as well as some swelling.  He notes episodes of instability and giving way.  This morning upon getting out of bed he states his knee gave way and caused him to nearly fall to the ground.  He denies numbness tingling or weakness.  Denies history of surgery to right knee.  Has tried treatment with over-the-counter braces with mild improved symptoms.    Review of Systems   Constitutional: Negative for chills and fever.   Gastrointestinal: Negative for abdominal pain, nausea and vomiting.   Musculoskeletal: Positive for joint pain ( right knee).   Skin: Negative for rash.   Neurological: Negative for tingling and sensory change.       Allergies   Allergen Reactions   • Amoxicillin      vomiting        Objective:   /82   Pulse 65   Temp 36.3 °C (97.4 °F) (Temporal)   Resp 16   Ht 1.88 m (6' 2\")   Wt (!) 134 kg (296 lb)   SpO2 98%   BMI 38.00 kg/m²     Physical Exam  Vitals and nursing note reviewed.   Constitutional:       General: He is not in acute distress.     Appearance: He is well-developed. He is not diaphoretic.   HENT:      Head: Normocephalic and atraumatic.      Right Ear: External ear normal.      Left Ear: External ear normal.    "   Nose: Nose normal.   Eyes:      General: No scleral icterus.        Right eye: No discharge.         Left eye: No discharge.      Conjunctiva/sclera: Conjunctivae normal.   Pulmonary:      Effort: Pulmonary effort is normal. No respiratory distress.   Musculoskeletal:      Cervical back: Neck supple.      Right knee: Effusion ( mild) present. No deformity, erythema, ecchymosis or lacerations. Decreased range of motion ( lacking terminal extension). Tenderness present over the medial joint line and lateral joint line. No LCL laxity, MCL laxity, ACL laxity ( trace (minimal) translation w/ ant drawer ) or PCL laxity. Abnormal meniscus. Normal alignment.      Instability Tests: Anterior drawer test negative. Posterior drawer test negative. Anterior Lachman test negative. Lateral Joes test positive. Medial Jose test negative.   Skin:     General: Skin is warm and dry.      Coloration: Skin is not pale.   Neurological:      Mental Status: He is alert and oriented to person, place, and time.      Coordination: Coordination normal.       DX knee -   HISTORY/REASON FOR EXAM:  Atraumatic Pain/Swelling/Deformity.        TECHNIQUE/EXAM DESCRIPTION AND NUMBER OF VIEWS:  4 views of the RIGHT knee.     COMPARISON: None     FINDINGS:  The alignment and mineralization are normal. No fracture or dislocation are identified.     IMPRESSION:     No radiographic evidence of acute traumatic bone injury.        Exam Ended: 10/26/21  1:35 PM Last Resulted: 10/26/21  1:37 PM           PROCEDURE:   Under verbally informed consent and sterile procedures patient's right knee is prepped w/ sterile solution, from superior lateral aspect and injection of 1 cc of Kenalog 40 mg per ml combined with 3 cc of lidocaine is performed.  Injection site covered w/ bandage.  Pt tolerates injection well.    Assessment/Plan:   1. Knee strain, right, initial encounter  - DX-KNEE COMPLETE 4+ RIGHT; Future  - REFERRAL TO ORTHOPEDICS    Other orders  -  ID NOW COVID-19 Kit; AS DIRECTED  Recommend conservative care, rest, ice, elevation, work on gentle ROM exercises, OTC NSAIDs for few days, continue with bracing as needed, follow-up with orthopedics with rapid return of symptoms, if symptoms resolved following today's injection okay to wait  Return to clinic with lack of resolution or progression of symptoms.      I have worn an N95 mask, gloves and eye protection for the entire encounter with this patient.     Differential diagnosis, natural history, supportive care, and indications for immediate follow-up discussed.    My total time spent caring for the patient on the day of the encounter was 30 minutes.   This does not include time spent on separately billable procedures/tests.

## 2021-11-03 RX ORDER — TRIAMCINOLONE ACETONIDE 40 MG/ML
40 INJECTION, SUSPENSION INTRA-ARTICULAR; INTRAMUSCULAR ONCE
OUTPATIENT
Start: 2021-11-03 | End: 2021-11-06

## 2021-11-15 PROBLEM — G89.29 CHRONIC PAIN OF RIGHT KNEE: Status: ACTIVE | Noted: 2021-11-15

## 2021-11-15 PROBLEM — M25.561 CHRONIC PAIN OF RIGHT KNEE: Status: ACTIVE | Noted: 2021-11-15

## 2025-06-10 ENCOUNTER — RESULTS FOLLOW-UP (OUTPATIENT)
Dept: URGENT CARE | Facility: PHYSICIAN GROUP | Age: 40
End: 2025-06-10

## 2025-06-10 ENCOUNTER — OFFICE VISIT (OUTPATIENT)
Dept: URGENT CARE | Facility: PHYSICIAN GROUP | Age: 40
End: 2025-06-10

## 2025-06-10 ENCOUNTER — HOSPITAL ENCOUNTER (OUTPATIENT)
Facility: MEDICAL CENTER | Age: 40
End: 2025-06-10

## 2025-06-10 VITALS
OXYGEN SATURATION: 95 % | BODY MASS INDEX: 38.37 KG/M2 | HEART RATE: 84 BPM | SYSTOLIC BLOOD PRESSURE: 112 MMHG | WEIGHT: 299 LBS | DIASTOLIC BLOOD PRESSURE: 68 MMHG | HEIGHT: 74 IN | RESPIRATION RATE: 18 BRPM | TEMPERATURE: 97 F

## 2025-06-10 DIAGNOSIS — J02.9 SORE THROAT: Primary | ICD-10-CM

## 2025-06-10 DIAGNOSIS — K21.9 GASTROESOPHAGEAL REFLUX DISEASE, UNSPECIFIED WHETHER ESOPHAGITIS PRESENT: ICD-10-CM

## 2025-06-10 LAB — S PYO DNA SPEC NAA+PROBE: NOT DETECTED

## 2025-06-10 PROCEDURE — 99203 OFFICE O/P NEW LOW 30 MIN: CPT

## 2025-06-10 PROCEDURE — 87070 CULTURE OTHR SPECIMN AEROBIC: CPT

## 2025-06-10 PROCEDURE — 3078F DIAST BP <80 MM HG: CPT

## 2025-06-10 PROCEDURE — 87651 STREP A DNA AMP PROBE: CPT

## 2025-06-10 PROCEDURE — 3074F SYST BP LT 130 MM HG: CPT

## 2025-06-10 RX ORDER — OMEPRAZOLE 20 MG/1
20 CAPSULE, DELAYED RELEASE ORAL DAILY
Qty: 30 CAPSULE | Refills: 0 | Status: SHIPPED | OUTPATIENT
Start: 2025-06-10

## 2025-06-10 ASSESSMENT — ENCOUNTER SYMPTOMS
HEARTBURN: 1
SORE THROAT: 1
COUGH: 0
FEVER: 0
NAUSEA: 0
CHILLS: 0
ABDOMINAL PAIN: 0
VOMITING: 0
SHORTNESS OF BREATH: 0

## 2025-06-10 NOTE — PROGRESS NOTES
"Chief Complaint   Patient presents with    Pharyngitis     Sx 2 days       HISTORY OF PRESENT ILLNESS: Patient is a pleasant 39 y.o. male who presents to urgent care today pain with swallowing.  Patient states this has been ongoing for the last 2 days.  He notes that he threw up in the middle of the night and has ongoing pain since.  He denies any fevers.  No nasal congestion or cough.  No more nausea.  Patient does have an extensive history of GERD.    Patient Active Problem List    Diagnosis Date Noted    Chronic pain of right knee 11/15/2021    URI (upper respiratory infection) 2018    Leg wound, left 2017    Obesity (BMI 30-39.9) 2017    Chronic right shoulder pain 10/06/2016    Gastroesophageal reflux disease without esophagitis 2015    BMI 31.0-31.9,adult 2015    Physiologic cupping of optic disc 2010       Allergies:Amoxicillin    Current Medications and Prescriptions Ordered in Epic[1]    Past Medical History[2]    Social History[3]    Family Status   Relation Name Status    Mo  Alive    Fa  Alive    Sis  Alive    MAunt      Kalpesh  Alive    Sis  (Not Specified)    Bro  (Not Specified)   No partnership data on file     Family History   Problem Relation Age of Onset    Hypertension Father     Cancer Maternal Aunt         brain    No Known Problems Sister     No Known Problems Brother        Review of Systems   Constitutional:  Negative for chills, fever and malaise/fatigue.   HENT:  Positive for sore throat. Negative for congestion and ear pain.    Respiratory:  Negative for cough and shortness of breath.    Gastrointestinal:  Positive for heartburn. Negative for abdominal pain, nausea and vomiting.   Skin:  Negative for rash.       Exam:  /68   Pulse 84   Temp 36.1 °C (97 °F) (Temporal)   Resp 18   Ht 1.88 m (6' 2\")   Wt (!) 136 kg (299 lb)   SpO2 95%   Physical Exam  Vitals reviewed.   Constitutional:       Appearance: Normal appearance. He is obese. "   HENT:      Head: Normocephalic.      Right Ear: Tympanic membrane and ear canal normal. There is no impacted cerumen. Tympanic membrane is not injected or erythematous.      Left Ear: Tympanic membrane and ear canal normal. There is no impacted cerumen. Tympanic membrane is not injected or erythematous.      Mouth/Throat:      Mouth: Mucous membranes are moist.      Pharynx: Oropharynx is clear. Posterior oropharyngeal erythema present. No oropharyngeal exudate.      Tonsils: No tonsillar exudate. 0 on the right. 0 on the left.   Eyes:      General:         Right eye: No discharge.         Left eye: No discharge.      Extraocular Movements: Extraocular movements intact.      Conjunctiva/sclera: Conjunctivae normal.      Pupils: Pupils are equal, round, and reactive to light.   Cardiovascular:      Rate and Rhythm: Normal rate and regular rhythm.      Pulses: Normal pulses.      Heart sounds: Normal heart sounds. No murmur heard.  Pulmonary:      Effort: Pulmonary effort is normal. No respiratory distress.      Breath sounds: Normal breath sounds. No stridor. No wheezing.   Musculoskeletal:         General: Normal range of motion.      Cervical back: Normal range of motion.   Lymphadenopathy:      Cervical: No cervical adenopathy.   Skin:     General: Skin is warm and dry.      Capillary Refill: Capillary refill takes less than 2 seconds.      Findings: No rash.   Neurological:      General: No focal deficit present.      Mental Status: He is alert.      Motor: No weakness.   Psychiatric:         Mood and Affect: Mood normal.         Behavior: Behavior normal.             Assessment/Plan:  1. Sore throat  - POCT GROUP A STREP, PCR  - CULTURE THROAT; Future    2. Gastroesophageal reflux disease, unspecified whether esophagitis present  - omeprazole (PRILOSEC) 20 MG delayed-release capsule; Take 1 Capsule by mouth every day.  Dispense: 30 Capsule; Refill: 0    Patient comes in with complaints of a sore throat for the  last 2 days.  History of GERD.  Taking OTC medications with little to no relief.  On physical exam it is noted patient has mild redness, no major swelling or tonsillar enlargement, no noted exudate or cervical adenopathy.  POCT strep complete to rule out potential causes.  Patient may be experiencing side effects of ongoing GERD, I did advise to take a daily Prilosec should his strep be negative especially given the circumstances of throwing up in the middle of the night.  Advised the use of Motrin with food and Tylenol for any ongoing discomfort, vitamin C, D and zinc.  Patient is aware of the plan of care and agreeable at this time, advised they follow-up if they continue to get worse or do not improve.    POCT strep was negative, patient notified via [employer].  Sent for culture.  Placed on a 30-day course of Prilosec. Total time spent with the patient 35 minutes to include, review of chart, charting, assessment, procedure.    06/11: Patient called stating increased pain with swallowing, no relief from OTC medications.  Awaiting culture results.  Sent prednisone and lidocaine swish and swallow for pain control measures.      Supportive care, differential diagnoses, and indications for immediate follow-up discussed with patient.   Pathogenesis of diagnosis discussed including typical length and natural progression.   Instructed to return to clinic or nearest emergency department for any change in condition, further concerns, or worsening of symptoms.  Patient states understanding of the plan of care and discharge instructions.  Instructed to make an appointment, for follow up, with primary care provider.    Please note that this dictation was created using voice recognition software. I have made every reasonable attempt to correct obvious errors, but I expect that there are errors of grammar and possibly content that I did not discover before finalizing the note.      Christina GONZALEZ          [1]   Current  Outpatient Medications Ordered in Epic   Medication Sig Dispense Refill    omeprazole (PRILOSEC) 20 MG delayed-release capsule Take 1 Capsule by mouth every day. 30 Capsule 0    meloxicam (MOBIC) 15 MG tablet Take 1 Tablet by mouth every day. (Patient not taking: Reported on 6/10/2025) 30 Tablet 0    ID NOW COVID-19 Kit AS DIRECTED (Patient not taking: Reported on 6/10/2025)       No current [facility]-ordered facility-administered medications on file.   [2]   Past Medical History:  Diagnosis Date    Anesthesia     wakes up combative    GERD (gastroesophageal reflux disease)     Shoulder pain    [3]   Social History  Tobacco Use    Smoking status: Never    Smokeless tobacco: Never   Substance Use Topics    Alcohol use: Yes     Alcohol/week: 0.0 oz     Comment: 1-2 drinks per week    Drug use: No

## 2025-06-11 DIAGNOSIS — J02.9 SORE THROAT: ICD-10-CM

## 2025-06-11 RX ORDER — PREDNISONE 10 MG/1
10 TABLET ORAL 2 TIMES DAILY
Qty: 6 TABLET | Refills: 0 | Status: SHIPPED | OUTPATIENT
Start: 2025-06-11 | End: 2025-06-14

## 2025-06-11 RX ORDER — LIDOCAINE HYDROCHLORIDE 20 MG/ML
15 SOLUTION OROPHARYNGEAL PRN
Qty: 200 ML | Refills: 0 | Status: SHIPPED | OUTPATIENT
Start: 2025-06-11

## 2025-06-13 LAB
BACTERIA SPEC RESP CULT: NORMAL
SIGNIFICANT IND 70042: NORMAL
SITE SITE: NORMAL
SOURCE SOURCE: NORMAL

## (undated) DEVICE — NEPTUNE 4 PORT MANIFOLD - (20/PK)

## (undated) DEVICE — KIT ANESTHESIA W/CIRCUIT & 3/LT BAG W/FILTER (20EA/CA)

## (undated) DEVICE — HUMID-VENT HEAT AND MOISTURE EXCHANGE- (50/BX)

## (undated) DEVICE — SENSOR SPO2 NEO LNCS ADHESIVE (20/BX) SEE USER NOTES

## (undated) DEVICE — SUTURE 3-0 ETHILON FS-1 - (36/BX) 30 INCH

## (undated) DEVICE — KIT ROOM DECONTAMINATION

## (undated) DEVICE — GLOVE BIOGEL PI ULTRATOUCH SZ 7.5 SURGICAL PF LF -(50/BX 4BX/CA)

## (undated) DEVICE — GLOVE BIOGEL SZ 8 SURGICAL PF LTX - (50PR/BX 4BX/CA)

## (undated) DEVICE — SPONGE GAUZE STER 4X4 8-PL - (2/PK 50PK/BX 12BX/CS)

## (undated) DEVICE — GLOVE BIOGEL PI INDICATOR SZ 6.5 SURGICAL PF LF - (50/BX 4BX/CA)

## (undated) DEVICE — WATER IRRIGATION STERILE 1000ML (12EA/CA)

## (undated) DEVICE — ELECTRODE 850 FOAM ADHESIVE - HYDROGEL RADIOTRNSPRNT (50/PK)

## (undated) DEVICE — SHAVER, 5.5 RESECTOR

## (undated) DEVICE — SPIDER SHOULDER HOLDER (12EA/BX)

## (undated) DEVICE — BAG, SPONGE COUNT 50600

## (undated) DEVICE — ARTHROWAND TURBOVAC 3.5/90 SCT

## (undated) DEVICE — ELECTRODE DUAL RETURN W/ CORD - (50/PK)

## (undated) DEVICE — GLOVE BIOGEL SZ 6.5 SURGICAL PF LTX (50PR/BX 4BX/CA)

## (undated) DEVICE — DRAPETIBURON SHOULDER W/POUCH - (5EA/CA)

## (undated) DEVICE — TUBING PATIENT W/CONNECTOR REDEUCE (1EA)

## (undated) DEVICE — PROTECTOR ULNA NERVE - (36PR/CA)

## (undated) DEVICE — CANISTER SUCTION RIGID RED 1500CC (40EA/CA)

## (undated) DEVICE — SODIUM CHL IRRIGATION 0.9% 1000ML (12EA/CA)

## (undated) DEVICE — GLOVE, LITE (PAIR)

## (undated) DEVICE — SHAVER4.0 AGGRESSIVE + FORMLA (5EA/BX)

## (undated) DEVICE — CANNULA KIT UNIV GREY - (10/BX)

## (undated) DEVICE — SODIUM CHL. IRRIGATION 0.9% 3000ML (4EA/CA 65CA/PF)

## (undated) DEVICE — TUBE CONNECTING SUCTION - CLEAR PLASTIC STERILE 72 IN (50EA/CA)

## (undated) DEVICE — TUBE E-T HI-LO CUFF 8.0MM (10EA/PK)

## (undated) DEVICE — SUCTION INSTRUMENT YANKAUER BULBOUS TIP W/O VENT (50EA/CA)

## (undated) DEVICE — PACK SHOULDER ARTHROSCOPY SM - (2EA/CA)

## (undated) DEVICE — LACTATED RINGERS INJ 1000 ML - (14EA/CA 60CA/PF)

## (undated) DEVICE — GLOVE BIOGEL PI ORTHO SZ 8 PF LF (40PR/BX)

## (undated) DEVICE — CHLORAPREP 26 ML APPLICATOR - ORANGE TINT(25/CA)

## (undated) DEVICE — GOWN WARMING STANDARD FLEX - (30/CA)

## (undated) DEVICE — MASK ANESTHESIA ADULT  - (100/CA)

## (undated) DEVICE — TUBING PUMP WITH CONNECTOR REDEUCE (1EA)

## (undated) DEVICE — SUTURE GENERAL

## (undated) DEVICE — HEAD HOLDER JUNIOR/ADULT